# Patient Record
Sex: FEMALE | Race: WHITE | NOT HISPANIC OR LATINO | URBAN - METROPOLITAN AREA
[De-identification: names, ages, dates, MRNs, and addresses within clinical notes are randomized per-mention and may not be internally consistent; named-entity substitution may affect disease eponyms.]

---

## 2023-01-31 ENCOUNTER — OFFICE VISIT (OUTPATIENT)
Dept: CARDIOLOGY | Facility: MEDICAL CENTER | Age: 63
End: 2023-01-31
Payer: COMMERCIAL

## 2023-01-31 VITALS
HEART RATE: 57 BPM | RESPIRATION RATE: 15 BRPM | WEIGHT: 206 LBS | SYSTOLIC BLOOD PRESSURE: 100 MMHG | HEIGHT: 62 IN | DIASTOLIC BLOOD PRESSURE: 82 MMHG | OXYGEN SATURATION: 95 % | BODY MASS INDEX: 37.91 KG/M2

## 2023-01-31 DIAGNOSIS — I10 PRIMARY HYPERTENSION: ICD-10-CM

## 2023-01-31 DIAGNOSIS — Z01.810 PRE-OPERATIVE CARDIOVASCULAR EXAMINATION: ICD-10-CM

## 2023-01-31 DIAGNOSIS — R00.2 PALPITATIONS: ICD-10-CM

## 2023-01-31 DIAGNOSIS — R01.1 HEART MURMUR: ICD-10-CM

## 2023-01-31 DIAGNOSIS — R06.09 DOE (DYSPNEA ON EXERTION): ICD-10-CM

## 2023-01-31 PROCEDURE — 99204 OFFICE O/P NEW MOD 45 MIN: CPT | Performed by: NURSE PRACTITIONER

## 2023-01-31 PROCEDURE — 93000 ELECTROCARDIOGRAM COMPLETE: CPT | Performed by: INTERNAL MEDICINE

## 2023-01-31 NOTE — PROGRESS NOTES
Cardiology New Consultation Note    Date of note:    2/1/2023  Primary Care Provider: No primary care provider on file.    Name:             Lillie Martinez  YOB: 1960  MRN:               [id number]    CC: Preop clearance    Patient HPI:   Lillie Martinez is a 62 y.o. female with current medical problems including hypertension, HLD, and Johnson's esophagus. Recently had an abnormal EKG and was referred to cardiology for preoperative clearance for an upcoming upper/lower endoscopy with [facility].    Underwent Eye surgery last summer.   Having random chest pain episodes, had it more with anxiety.  Has palpitations at times, feels her HR is in the 100's when experiencing them.  She has random sharp chest pains. Not lasting long, not with exertion.   She has had DELGADO with exertion for a few years, no CP with exertion.   She has fallen frequently in the past year, feels unbalanced, and decreased strength in legs.   Has gained 20lbs in the past year. Not able to exercise d/t hip pain.  She denies dizziness or syncopal episodes, orthopnea, PND, lower extremity swelling, and recent weight gain.  Patient endorses medication compliance    Cardiovascular Risk Factors:  1. Smoking status: never  2. Type II Diabetes Mellitus: prediabetic No results found for: HBA1C  3. Hypertension: yes  4. Dyslipidemia: yes No results found for: CHOLSTRLTOT, LDL, HDL, TRIGLYCERIDE  5. Family history of early Coronary Artery Disease in a first degree relative (Male less than 55 years of age; Female less than 65 years of age): Father @ 61  6.  Obesity and/or Metabolic Syndrome: BMI 37.7  7. Sedentary lifestyle: No     Review of systems:  All others systems reviewed and negative except for what is outlined in the above HPI    History reviewed. No pertinent past medical history.  History reviewed. No pertinent surgical history.  History reviewed. No pertinent family history.  Social History     Socioeconomic History    Marital  "status:      Spouse name: Not on file    Number of children: Not on file    Years of education: Not on file    Highest education level: Not on file   Occupational History    Not on file   Tobacco Use    Smoking status: Never    Smokeless tobacco: Never   Substance and Sexual Activity    Alcohol use: Not Currently    Drug use: Never    Sexual activity: Not on file   Other Topics Concern    Not on file   Social History Narrative    Not on file     Social Determinants of Health     Financial Resource Strain: Not on file   Food Insecurity: Not on file   Transportation Needs: Not on file   Physical Activity: Not on file   Stress: Not on file   Social Connections: Not on file   Intimate Partner Violence: Not on file   Housing Stability: Not on file     Allergies   Allergen Reactions    Penicillins Unspecified and Rash     Other reaction(s): \"thrush in the mouth\"  thrush  thrush      Sudafed Hives    Lactose Diarrhea    Pseudobulb Hives, Itching and Palpitations    Pseudoephedrine Hcl Hives    Rabbit Protein Hives, Itching and Runny Nose     Current Outpatient Medications   Medication Sig Dispense Refill    atorvastatin (LIPITOR) 10 MG Tab Take 10 mg by mouth every day.      vitamin D3 (CHOLECALCIFEROL) 5000 Unit (125 mcg) Tab Take 5,000 Units by mouth every day.      fexofenadine (ALLEGRA ALLERGY) 180 MG tablet Take 180 mg by mouth every day.      lisinopril (PRINIVIL) 10 MG Tab Take 10 mg by mouth every day.      hydroCHLOROthiazide (HYDRODIURIL) 25 MG Tab Take 25 mg by mouth every day.      omeprazole (PRILOSEC) 20 MG delayed-release capsule Take 20 mg by mouth every day.      sertraline (ZOLOFT) 100 MG Tab Take 100 mg by mouth every day.       No current facility-administered medications for this visit.       Physical Exam:  Ambulatory Vitals  /82 (BP Location: Left arm, Patient Position: Sitting, BP Cuff Size: Adult)   Pulse (!) 57   Resp 15   Ht 1.575 m (5' 2\")   Wt 93.4 kg (206 lb)   SpO2 95%  "   BP Readings from Last 4 Encounters:   23 100/82       Weight/BMI: Body mass index is 37.68 kg/m².  Wt Readings from Last 4 Encounters:   23 93.4 kg (206 lb)       General: No apparent distress. Well nourished. BMI 37.7   Neck: No JVD. No caroid bruits, trachea midline  Lungs: CTAB. Normal effort, without crackles/rhonchi, no wheezing  Heart: RRR. Normal S1/S2, soft 2/6 murmur, no rub. No lower extremity edema. 2+ radial pulses, 2+ DT pulses  Ext: No clubbing or cyanosis.  Abdomen: soft, non tender, non distended, no simone hepatomegaly.  Neurological: No focal deficits, no facial asymmetry.  Normal speech.  Psychiatric: Appropriate affect, alert and oriented x 4.   Skin: Warm and dry, no rash.    Lab Data Review:  No results found for: CHOLSTRLTOT, LDL, HDL, TRIGLYCERIDE    No results found for: SODIUM, POTASSIUM, CHLORIDE, CO2, GLUCOSE, BUN, CREATININE, BUNCREATRAT, GLOMRATE  No results found for: ALKPHOSPHAT, ASTSGOT, ALTSGPT, TBILIRUBIN   No results found for: WBC    Cardiac Imaging and Procedures Review:      EKG 23: My Personal interpretation reveals SB 57, no ST changes or T wave abnormalities    Assessment and Clinical Decision Makin. Pre-operative cardiovascular examination  EKG      2. Primary hypertension        3. Palpitations  Holter Monitor Study    EC-ECHOCARDIOGRAM COMPLETE W/O CONT      4. DELGADO (dyspnea on exertion)  EC-ECHOCARDIOGRAM COMPLETE W/O CONT      5. Heart murmur  EC-ECHOCARDIOGRAM COMPLETE W/O CONT        The following treatment plan was discussed    Preop clearance  -Patient is low risk of cardiovascular complications for low to moderate risk surgery or procedures. She is medically optimized based on my last in person assessment, and if her symptoms have not changed, surgery should proceed without further cardiac work-up.  No further cardiac testing needed prior to endoscopy    HTN  -Well controlled today in clinic  -Continue lisinopril 10 mg daily,  hydrochlorothiazide 25 mg daily    Palpitations  -Patient symptomatic, lasting few minutes  -Obtain 2-week Zio patch monitor    DELGADO  History of heart murmur  -Obtain baseline echocardiogram    Plan reviewed in detail with the patient, verbalizes understanding and is in agreement.  Pt is to follow up with myself in 4 months after Echo completed  Encouraged Pt to follow up with us over the phone or electronically using my [employer] as cardiac issues/concerns arise.      PLEASE NOTE: This dictation was created using voice recognition software. I have made every reasonable attempt to correct obvious errors, but I expect that there are errors of grammar and possibly content that I did not discover before finalizing the note.       ELIAN Riddle.   [facility]  [phone]    Collaborating Physician: Dr. Parisi

## 2023-02-01 PROBLEM — K64.9 HEMORRHOIDS: Status: ACTIVE | Noted: 2023-02-01

## 2023-02-01 PROBLEM — K92.2 GI BLEED: Status: ACTIVE | Noted: 2023-02-01

## 2023-02-01 PROBLEM — F32.9 MAJOR DEPRESSIVE DISORDER, SINGLE EPISODE, UNSPECIFIED: Status: ACTIVE | Noted: 2023-02-01

## 2023-02-01 PROBLEM — Z86.010 PERSONAL HISTORY OF COLONIC POLYPS: Status: ACTIVE | Noted: 2023-02-01

## 2023-02-01 PROBLEM — R00.2 PALPITATIONS: Status: ACTIVE | Noted: 2023-02-01

## 2023-02-01 PROBLEM — F41.9 ANXIETY DISORDER: Status: ACTIVE | Noted: 2023-02-01

## 2023-02-01 PROBLEM — K57.92 DIVERTICULITIS: Status: ACTIVE | Noted: 2023-02-01

## 2023-02-01 PROBLEM — K22.70 BARRETT'S ESOPHAGUS WITHOUT DYSPLASIA: Status: ACTIVE | Noted: 2023-02-01

## 2023-02-01 PROBLEM — K21.9 GASTRO-ESOPHAGEAL REFLUX DISEASE WITHOUT ESOPHAGITIS: Status: ACTIVE | Noted: 2023-02-01

## 2023-02-01 PROBLEM — R06.00 DYSPNEA, UNSPECIFIED: Status: ACTIVE | Noted: 2023-02-01

## 2023-02-01 PROBLEM — R01.1 HEART MURMUR: Status: ACTIVE | Noted: 2023-02-01

## 2023-02-01 PROBLEM — K58.9 IRRITABLE BOWEL SYNDROME: Status: ACTIVE | Noted: 2023-02-01

## 2023-02-01 PROBLEM — Z86.0100 PERSONAL HISTORY OF COLONIC POLYPS: Status: ACTIVE | Noted: 2023-02-01

## 2023-02-01 LAB — EKG IMPRESSION: NORMAL

## 2023-02-01 RX ORDER — SERTRALINE HYDROCHLORIDE 100 MG/1
100 TABLET, FILM COATED ORAL DAILY
COMMUNITY
Start: 2022-12-27

## 2023-02-01 RX ORDER — LISINOPRIL 10 MG/1
10 TABLET ORAL DAILY
COMMUNITY
Start: 2020-05-08

## 2023-02-01 RX ORDER — ATORVASTATIN CALCIUM 10 MG/1
10 TABLET, FILM COATED ORAL DAILY
COMMUNITY
Start: 2022-06-02

## 2023-02-01 RX ORDER — HYDROCHLOROTHIAZIDE 25 MG/1
25 TABLET ORAL DAILY
COMMUNITY
Start: 2023-01-09

## 2023-02-01 RX ORDER — OMEPRAZOLE 20 MG/1
20 CAPSULE, DELAYED RELEASE ORAL DAILY
COMMUNITY
Start: 2022-11-29

## 2023-02-01 RX ORDER — FEXOFENADINE HCL 180 MG/1
180 TABLET ORAL DAILY
COMMUNITY
Start: 2020-11-20

## 2023-02-06 ENCOUNTER — TELEPHONE (OUTPATIENT)
Dept: HEALTH INFORMATION MANAGEMENT | Facility: OTHER | Age: 63
End: 2023-02-06

## 2023-02-17 ENCOUNTER — TELEPHONE (OUTPATIENT)
Dept: CARDIOLOGY | Facility: MEDICAL CENTER | Age: 63
End: 2023-02-17

## 2023-02-17 NOTE — TELEPHONE ENCOUNTER
MR    Caller: Anai    Office Name, phone number, fax number: [facility] / PH. [phone] / FAX:  [phone]    Fax clearance to [phone] on 02-    Procedure Name: Colonoscopy & Endoscopy    Procedure Scheduled Date: 02-    Callback Number: [phone]

## 2023-02-22 NOTE — TELEPHONE ENCOUNTER
Received fax from [facility] (P: [phone] F: [phone]) requesting:    - cardiac clearance for upcoming EGD & colonoscopy scheduled 2/27/2023.    Last OV note printed and faxed to above number, completed status received.  Fax sent to scanning for reference via [employer] completed status received.

## 2023-06-19 PROBLEM — M16.12 PRIMARY OSTEOARTHRITIS OF LEFT HIP: Status: ACTIVE | Noted: 2023-06-19

## 2024-02-01 ENCOUNTER — PATIENT MESSAGE (OUTPATIENT)
Dept: HEALTH INFORMATION MANAGEMENT | Facility: OTHER | Age: 64
End: 2024-02-01

## 2024-04-03 ENCOUNTER — APPOINTMENT (OUTPATIENT)
Dept: RADIOLOGY | Facility: MEDICAL CENTER | Age: 64
End: 2024-04-03
Attending: EMERGENCY MEDICINE
Payer: COMMERCIAL

## 2024-04-03 ENCOUNTER — HOSPITAL ENCOUNTER (EMERGENCY)
Facility: MEDICAL CENTER | Age: 64
End: 2024-04-03
Attending: EMERGENCY MEDICINE
Payer: COMMERCIAL

## 2024-04-03 VITALS
DIASTOLIC BLOOD PRESSURE: 63 MMHG | HEART RATE: 58 BPM | WEIGHT: 196.87 LBS | SYSTOLIC BLOOD PRESSURE: 110 MMHG | BODY MASS INDEX: 36.01 KG/M2 | OXYGEN SATURATION: 95 % | RESPIRATION RATE: 20 BRPM | TEMPERATURE: 98.2 F

## 2024-04-03 DIAGNOSIS — M79.602 MUSCULOSKELETAL PAIN OF LEFT UPPER EXTREMITY: ICD-10-CM

## 2024-04-03 DIAGNOSIS — R07.81 RIB PAIN ON LEFT SIDE: ICD-10-CM

## 2024-04-03 DIAGNOSIS — G89.29 CHRONIC LEFT SHOULDER PAIN: ICD-10-CM

## 2024-04-03 DIAGNOSIS — M25.512 CHRONIC LEFT SHOULDER PAIN: ICD-10-CM

## 2024-04-03 DIAGNOSIS — G47.9 SLEEP DISTURBANCE: ICD-10-CM

## 2024-04-03 LAB
ALBUMIN SERPL BCP-MCNC: 4.2 G/DL (ref 3.2–4.9)
ALBUMIN/GLOB SERPL: 1.6 G/DL
ALP SERPL-CCNC: 85 U/L (ref 30–99)
ALT SERPL-CCNC: 20 U/L (ref 2–50)
ANION GAP SERPL CALC-SCNC: 14 MMOL/L (ref 7–16)
AST SERPL-CCNC: 22 U/L (ref 12–45)
BASOPHILS # BLD AUTO: 0.6 % (ref 0–1.8)
BASOPHILS # BLD: 0.03 K/UL (ref 0–0.12)
BILIRUB SERPL-MCNC: 0.8 MG/DL (ref 0.1–1.5)
BUN SERPL-MCNC: 23 MG/DL (ref 8–22)
CALCIUM ALBUM COR SERPL-MCNC: 9.6 MG/DL (ref 8.5–10.5)
CALCIUM SERPL-MCNC: 9.8 MG/DL (ref 8.5–10.5)
CHLORIDE SERPL-SCNC: 103 MMOL/L (ref 96–112)
CO2 SERPL-SCNC: 24 MMOL/L (ref 20–33)
COHGB MFR BLD: 2.5 % (ref 0–4.9)
CREAT SERPL-MCNC: 0.72 MG/DL (ref 0.5–1.4)
EKG IMPRESSION: NORMAL
EOSINOPHIL # BLD AUTO: 0.04 K/UL (ref 0–0.51)
EOSINOPHIL NFR BLD: 0.8 % (ref 0–6.9)
ERYTHROCYTE [DISTWIDTH] IN BLOOD BY AUTOMATED COUNT: 46.3 FL (ref 35.9–50)
GFR SERPLBLD CREATININE-BSD FMLA CKD-EPI: 93 ML/MIN/1.73 M 2
GLOBULIN SER CALC-MCNC: 2.6 G/DL (ref 1.9–3.5)
GLUCOSE SERPL-MCNC: 91 MG/DL (ref 65–99)
HCT VFR BLD AUTO: 38.3 % (ref 37–47)
HGB BLD-MCNC: 12.5 G/DL (ref 12–16)
IMM GRANULOCYTES # BLD AUTO: 0.01 K/UL (ref 0–0.11)
IMM GRANULOCYTES NFR BLD AUTO: 0.2 % (ref 0–0.9)
LYMPHOCYTES # BLD AUTO: 1.26 K/UL (ref 1–4.8)
LYMPHOCYTES NFR BLD: 25.8 % (ref 22–41)
MCH RBC QN AUTO: 28.5 PG (ref 27–33)
MCHC RBC AUTO-ENTMCNC: 32.6 G/DL (ref 32.2–35.5)
MCV RBC AUTO: 87.2 FL (ref 81.4–97.8)
MONOCYTES # BLD AUTO: 0.35 K/UL (ref 0–0.85)
MONOCYTES NFR BLD AUTO: 7.2 % (ref 0–13.4)
NEUTROPHILS # BLD AUTO: 3.19 K/UL (ref 1.82–7.42)
NEUTROPHILS NFR BLD: 65.4 % (ref 44–72)
NRBC # BLD AUTO: 0 K/UL
NRBC BLD-RTO: 0 /100 WBC (ref 0–0.2)
PLATELET # BLD AUTO: 177 K/UL (ref 164–446)
PMV BLD AUTO: 9.5 FL (ref 9–12.9)
POTASSIUM SERPL-SCNC: 3.4 MMOL/L (ref 3.6–5.5)
PROT SERPL-MCNC: 6.8 G/DL (ref 6–8.2)
RBC # BLD AUTO: 4.39 M/UL (ref 4.2–5.4)
SODIUM SERPL-SCNC: 141 MMOL/L (ref 135–145)
WBC # BLD AUTO: 4.9 K/UL (ref 4.8–10.8)

## 2024-04-03 PROCEDURE — 80053 COMPREHEN METABOLIC PANEL: CPT

## 2024-04-03 PROCEDURE — 73030 X-RAY EXAM OF SHOULDER: CPT | Mod: LT

## 2024-04-03 PROCEDURE — 36415 COLL VENOUS BLD VENIPUNCTURE: CPT

## 2024-04-03 PROCEDURE — 71101 X-RAY EXAM UNILAT RIBS/CHEST: CPT | Mod: LT

## 2024-04-03 PROCEDURE — 73080 X-RAY EXAM OF ELBOW: CPT | Mod: LT

## 2024-04-03 PROCEDURE — 73090 X-RAY EXAM OF FOREARM: CPT | Mod: LT

## 2024-04-03 PROCEDURE — 93005 ELECTROCARDIOGRAM TRACING: CPT | Performed by: EMERGENCY MEDICINE

## 2024-04-03 PROCEDURE — 99283 EMERGENCY DEPT VISIT LOW MDM: CPT

## 2024-04-03 PROCEDURE — 85025 COMPLETE CBC W/AUTO DIFF WBC: CPT

## 2024-04-03 PROCEDURE — 82375 ASSAY CARBOXYHB QUANT: CPT

## 2024-04-03 NOTE — ED NOTES
Pt d/c home ambulatory with family. Pt given d/c instructions and signed d/c paper. Pt educated on follow up plan, pt verbalized understanding of d/c instructions. PT iv removed with bleeding controlled tip intact. PT vs stable. Pt had all belongings at d/c.

## 2024-04-03 NOTE — DISCHARGE INSTRUCTIONS
Please call the sleep medicine clinic listed above to schedule follow-up appointment for complete recheck.  Return to the emergency department if you develop any new or worsening symptoms including chest pain, shortness of breath, fevers, cough, worsening arm pain, or any further concerns.  You may continue to follow-up with your primary care clinic for coordination of specialist care, as well as orthopedic clinic for continued treatment of rotator cuff injury.

## 2024-04-03 NOTE — ED TRIAGE NOTES
"Chief Complaint   Patient presents with    Sleep Problem     Pt reports \"I jumped out of bed last night and hit my nose, experiencing a bloody nose.\"  She reports this is her second event of waking up from a nightmare and jumping out of bed and hurting herself.       Pt does not take blood thinner.  Epistaxis stopped before arriving to hospital.  C/O pain to L side of body.    "

## 2024-04-03 NOTE — ED NOTES
Pt ambulated to room GRN 23 without incident, pt oriented to care area. Pt changing into gown, chart up for ERP.

## 2024-04-03 NOTE — ED PROVIDER NOTES
"Emergency Physician Note    Chief Concern:  Chief Complaint   Patient presents with    Sleep Problem     Pt reports \"I jumped out of bed last night and hit my nose, experiencing a bloody nose.\"  She reports this is her second event of waking up from a nightmare and jumping out of bed and hurting herself.         HPI/ROS    HPI:  Lillie Martinez is a 63 y.o. female who presents to the emergency department today for some left-sided body aches after she fell out of bed overnight.  She reports that she jumped up out of bed while she was sleeping, did not realize she did this, and fell striking her nose, and landing on the left side of her body.  She initially had an episode of epistaxis which is since resolved.  She is not having any facial pain, no ongoing epistaxis.  And landing on the left side of her body.  She initially had an episode of epistaxis which is since resolved.  She is not having any facial pain, no ongoing epistaxis.  She has history of left rotator cuff, states that pain from her rotator cuff injury has been exacerbated after the fall.  She also reports some discomfort localized to the left ribs, though no shortness of breath and no pain with taking a deep breath.  She also reports some pain localized to the left elbow and left forearm, though states she is able to fully range the elbow and wrist.  Range of motion of the left shoulder is limited by known rotator cuff tear.    She is not currently on any sleep medications, does not take Ambien or Lunesta.  She reports 1 similar episode of getting up out of bed without realizing it that occurred about 6 months ago.  Her other concern today is that she may have a REM sleep disorder, which a family member reported to her after googling her symptoms.    She reports no associated headache, no nausea, no vomiting, she does not have any neck or back pain.    PAST MEDICAL HISTORY  History reviewed. No pertinent past medical history.    SURGICAL " HISTORY  History reviewed. No pertinent surgical history.    FAMILY HISTORY  History reviewed. No pertinent family history.    SOCIAL HISTORY   reports that she has never smoked. She has never used smokeless tobacco. She reports that she does not currently use alcohol. She reports that she does not use drugs.    CURRENT MEDICATIONS  Discharge Medication List as of 4/3/2024 11:02 AM        CONTINUE these medications which have NOT CHANGED    Details   atorvastatin (LIPITOR) 10 MG Tab Take 10 mg by mouth every day., Historical Med      vitamin D3 (CHOLECALCIFEROL) 5000 Unit (125 mcg) Tab Take 5,000 Units by mouth every day., Historical Med      fexofenadine (ALLEGRA ALLERGY) 180 MG tablet Take 180 mg by mouth every day., Historical Med      hydroCHLOROthiazide (HYDRODIURIL) 25 MG Tab Take 25 mg by mouth every day., Historical Med      lisinopril (PRINIVIL) 10 MG Tab Take 10 mg by mouth every day., Historical Med      omeprazole (PRILOSEC) 20 MG delayed-release capsule Take 20 mg by mouth every day., Historical Med      sertraline (ZOLOFT) 100 MG Tab Take 100 mg by mouth every day., Historical Med             ALLERGIES  Penicillins, Sudafed, Lactose, Pseudobulb, Pseudoephedrine hcl, and Rabbit protein    PHYSICAL EXAM  Vital Signs: /63   Pulse (!) 58   Temp 36.8 °C (98.2 °F) (Temporal)   Resp 20   Wt 89.3 kg (196 lb 13.9 oz)   SpO2 95%   BMI 36.01 kg/m²   Constitutional: Alert, no acute distress  HENT: Normocephalic, atraumatic, no nasal septal hematoma  Neck: No bony midline tenderness to palpation, no pain with range of motion  Cardiovascular: No tachycardia  Pulmonary: No respiratory distress, normal work of breathing, breath sounds quiet and equal bilaterally  Abdomen: Soft, non tender, nondistended  Skin: Warm, dry, no rashes or lesions  Musculoskeletal: She does have some mild discomfort on palpation of the left ribs.  Somewhat limited range of motion of the left shoulder which is reported present at  baseline and unchanged today.  She has no point bony tenderness to palpation of the left elbow or forearm, does appear to have full range of motion of the left elbow, and wrist, with reproduced pain on range of motion of the left elbow.  Neurologic: Alert, oriented, normal motor function, no speech deficits, no facial droop, 5 out of 5 bilateral upper and lower extremity strength, no ataxia  Psychiatric: Normal and appropriate mood and affect    Diagnostic Studies & Procedures    Labs:  All labs reviewed by me as noted below.    EK Lead EKG interpreted by me as noted below:  Results for orders placed or performed during the hospital encounter of 24   EKG   Result Value Ref Range    Report       [facility] Emergency Dept.    Test Date:  2024  Pt Name:    KYLER TREADWELL                  Department: ER  MRN:        [id number]                      Room:       [facility]  Gender:     Female                       Technician: [id number]  :        1960                   Requested By:EZ BYERS  Order #:    [id number]                    Reading MD: EZ BYERS MD    Measurements  Intervals                                Axis  Rate:       55                           P:          72  VT:         163                          QRS:        -20  QRSD:       112                          T:          18  QT:         473  QTc:        453    Interpretive Statements  Rate 55, sinus rhythm, no ST elevation or depression, no pathologic T wave  inversions, normal QTc, no significant changes compared to prior EKG, she  does have slight T wave deflections in V1 - V3 unchanged from prior.  Electronically Signed On 2024 10:54:41 PDT by EZ BYERS MD         Radiology:  The attending Emergency Physician has independently interpreted the following imaging:  I independently interpreted the plain film of the elbow, No evidence of fracture or dislocation, radial head is normal-appearing without  evidence of fracture.    DX-SHOULDER 2+ LEFT   Final Result      1.  Normal shoulder series.      [id number] (WITH 1-VIEW CXR) LEFT   Final Result      Normal rib series.      DX-FOREARM LEFT   Final Result      Normal forearm series.      DX-ELBOW-COMPLETE 3+ LEFT   Final Result      Normal elbow series.          Course and Medical Decision Making    Initial Assessment and Plan:  Ms. Martinez presents to the emergency department today for evaluation after an abnormal episode of sleep.  Primary concern is for possible injury to the left upper extremity or left chest wall.  Her physical examination is reassuring with no point tenderness, she has no hypoxia, no significant pain with respirations, clinically symptoms are less concerning for rib fracture, chest wall contusion is a consideration.  She also has full range of motion of the left elbow and forearm with no point bony tenderness to palpation.    On laboratory evaluation CBC is entirely within normal limits.  CMP is reassuring with no electrolyte abnormality.  Carboxyhemoglobin is within normal limits, no evidence of carbon monoxide toxicity disturbing sleep.  She has a normal platelet count, normal liver enzymes, no evidence of bleeding disorder.    Plain films of the left ribs, left shoulder, left elbow, and left forearm demonstrate no acute abnormalities.    Suspect symptoms are likely due to soft tissue or muscular contusions from the fall.  Do not believe advanced imaging is necessary at this time.  Additionally, she is concerned about a possible REM sleep disorder, I have provided follow-up information as well as a referral to pulmonary and sleep medicine, as a believe she would likely benefit from a sleep study.    Additional Problems and Disposition    Additional problems addressed:   1.  Concern for REM sleep disorder -referral placed for pulmonary sleep medicine    Escalation of care considered, and ultimately not performed:   1.  CT  imaging/advanced imaging -CT imaging the brain was considered based on head injury, however she has no headache, no nausea, no vomiting, did not report loss of consciousness, no indication for escalation to advanced imaging of the brain at this time.    Decision tools utilized including but not limited to:  Nexus criteria -no indication for cervical spine imaging    Disposition:  Discharged in stable condition    FINAL IMPRESSION   1. Sleep disturbance    2. Chronic left shoulder pain    3. Rib pain on left side    4. Musculoskeletal pain of left upper extremity        FOLLOW UP:  GABRIEL Portillo  [address]  [facility] [id number] [phone]    Schedule an appointment as soon as possible for a visit       [facility] - Operated by [facility]  [address]  [facility] [id number] [phone]  Schedule an appointment as soon as possible for a visit       [facility], Emergency Dept  [address] [facility] [address] [phone]  Go to   If symptoms worsen

## 2024-11-20 ENCOUNTER — TELEPHONE (OUTPATIENT)
Dept: HEALTH INFORMATION MANAGEMENT | Facility: OTHER | Age: 64
End: 2024-11-20
Payer: COMMERCIAL

## 2024-12-09 ENCOUNTER — TELEPHONE (OUTPATIENT)
Dept: HEALTH INFORMATION MANAGEMENT | Facility: OTHER | Age: 64
End: 2024-12-09
Payer: COMMERCIAL

## 2024-12-09 ENCOUNTER — APPOINTMENT (OUTPATIENT)
Dept: CARDIOLOGY | Facility: MEDICAL CENTER | Age: 64
End: 2024-12-09
Payer: COMMERCIAL

## 2024-12-30 ENCOUNTER — APPOINTMENT (OUTPATIENT)
Dept: CARDIOLOGY | Facility: MEDICAL CENTER | Age: 64
End: 2024-12-30
Attending: PHYSICIAN ASSISTANT
Payer: COMMERCIAL

## 2024-12-30 ENCOUNTER — TELEPHONE (OUTPATIENT)
Dept: HEALTH INFORMATION MANAGEMENT | Facility: OTHER | Age: 64
End: 2024-12-30
Payer: COMMERCIAL

## 2025-02-07 ENCOUNTER — TELEPHONE (OUTPATIENT)
Dept: CARDIOLOGY | Facility: MEDICAL CENTER | Age: 65
End: 2025-02-07
Payer: COMMERCIAL

## 2025-02-07 ENCOUNTER — OFFICE VISIT (OUTPATIENT)
Dept: CARDIOLOGY | Facility: MEDICAL CENTER | Age: 65
End: 2025-02-07
Payer: COMMERCIAL

## 2025-02-07 VITALS
OXYGEN SATURATION: 96 % | HEART RATE: 68 BPM | WEIGHT: 198 LBS | RESPIRATION RATE: 16 BRPM | HEIGHT: 63 IN | DIASTOLIC BLOOD PRESSURE: 80 MMHG | SYSTOLIC BLOOD PRESSURE: 118 MMHG | BODY MASS INDEX: 35.08 KG/M2

## 2025-02-07 DIAGNOSIS — R00.2 PALPITATIONS: ICD-10-CM

## 2025-02-07 DIAGNOSIS — E78.5 DYSLIPIDEMIA: ICD-10-CM

## 2025-02-07 DIAGNOSIS — R40.0 DAYTIME SLEEPINESS: ICD-10-CM

## 2025-02-07 DIAGNOSIS — I10 PRIMARY HYPERTENSION: ICD-10-CM

## 2025-02-07 PROCEDURE — 99212 OFFICE O/P EST SF 10 MIN: CPT

## 2025-02-07 PROCEDURE — 3074F SYST BP LT 130 MM HG: CPT

## 2025-02-07 PROCEDURE — 3079F DIAST BP 80-89 MM HG: CPT

## 2025-02-07 PROCEDURE — 99214 OFFICE O/P EST MOD 30 MIN: CPT

## 2025-02-07 RX ORDER — HYDROCHLOROTHIAZIDE 25 MG/1
25 TABLET ORAL DAILY
Qty: 90 TABLET | Refills: 3 | Status: SHIPPED | OUTPATIENT
Start: 2025-02-07

## 2025-02-07 RX ORDER — LISINOPRIL 10 MG/1
10 TABLET ORAL DAILY
Qty: 100 TABLET | Refills: 3 | Status: SHIPPED | OUTPATIENT
Start: 2025-02-07

## 2025-02-07 RX ORDER — ATORVASTATIN CALCIUM 10 MG/1
10 TABLET, FILM COATED ORAL DAILY
Qty: 100 TABLET | Refills: 3 | Status: SHIPPED | OUTPATIENT
Start: 2025-02-07

## 2025-02-07 ASSESSMENT — ENCOUNTER SYMPTOMS
LIGHT-HEADEDNESS: 0
HEADACHES: 0
SYNCOPE: 0
SHORTNESS OF BREATH: 0
ORTHOPNEA: 0
DIZZINESS: 0
NEAR-SYNCOPE: 0
PND: 0
PALPITATIONS: 0

## 2025-02-07 ASSESSMENT — FIBROSIS 4 INDEX: FIB4 SCORE: 1.81

## 2025-02-07 NOTE — Clinical Note
Hi,  Can you please request records from [facility] cardiology for any echocardiogram or diagnostics. She said she had an echocardiogram done. She said it might also have been at [facility] because she was on a sliding scale. If you can request both for any cardiac testing.  Thank you!

## 2025-02-07 NOTE — TELEPHONE ENCOUNTER
Unable to reach Pt left VM regarding her appt today with [facility]. Called to see if she can come in at 2pm instead of 4:15pm.

## 2025-02-08 NOTE — PROGRESS NOTES
"Chief Complaint   Patient presents with    Hypertension     FV DX:Pre-operative cardiovascular examination    Palpitations          Subjective:   Lillie Martinez is a 64 y.o. female who presents today for follow-up.     Previous patient of Flaquita Ha.  Current medical problems include HTN, HLD and Johnson's esophagua. Their last clinic visit was 1/31/2023 with Flaquita Ha.    Today's visit:  Patient reports she feels she has been doing overall well from a cardiac perspective. She does report she has some exertional dyspnea but could be due to deconditioning as she finds it difficult to do activity due to her pain in her knee which she is hoping to have surgery on to replace. She does report some occasional chest pain with sitting. She says she will have some every couple of months but yolanda any chest pain with exertion. She denies palpations, lightheadedness/dizziness, orthopnea, PND, edema or syncope. She said last year she had a echocardiogram done at either [facility] or [facility] which she is not sure the results. Due to insurance she was being followed by Dr. JAIME Tee at [facility] but she wants to re-establish with [facility]. We do not have the records from the echocardiogram to review at today's visit.     Cardiovascular Risk Factors:  1. Smoking status: Never  2. Type II Diabetes Mellitus: No No results found for: \"HBA1C\"  3. Hypertension: Yes  4. Dyslipidemia: Yes No results found for: \"CHOLSTRLTOT\", \"LDL\", \"HDL\", \"TRIGLYCERIDE\"  No past medical history on file.      No family history on file.      Social History     Tobacco Use    Smoking status: Never    Smokeless tobacco: Never   Vaping Use    Vaping status: Never Used   Substance Use Topics    Alcohol use: Not Currently    Drug use: Never         Allergies   Allergen Reactions    Penicillins Unspecified and Rash     Other reaction(s): \"thrush in the mouth\"  thrush  thrush      Sudafed Hives    Lactose Diarrhea    Mixed Feathers Itching and Runny " "Nose    Pseudobulb Hives, Itching and Palpitations    Pseudoephedrine Hcl Hives    Rabbit Protein Hives, Itching and Runny Nose         Current Outpatient Medications   Medication Sig    metFORMIN (GLUCOPHAGE) 500 MG Tab Take 500 mg by mouth every day.    hydroCHLOROthiazide 25 MG Tab Take 1 Tablet by mouth every day.    atorvastatin (LIPITOR) 10 MG Tab Take 1 Tablet by mouth every day.    lisinopril (PRINIVIL) 10 MG Tab Take 1 Tablet by mouth every day.    vitamin D3 (CHOLECALCIFEROL) 5000 Unit (125 mcg) Tab Take 5,000 Units by mouth every day.    fexofenadine (ALLEGRA ALLERGY) 180 MG tablet Take 180 mg by mouth every day.    omeprazole (PRILOSEC) 20 MG delayed-release capsule Take 20 mg by mouth every day.    sertraline (ZOLOFT) 100 MG Tab Take 200 mg by mouth every day.         Review of Systems   Constitutional: Negative for malaise/fatigue.   Cardiovascular:  Positive for chest pain and dyspnea on exertion. Negative for leg swelling, near-syncope, orthopnea, palpitations, paroxysmal nocturnal dyspnea and syncope.   Respiratory:  Negative for shortness of breath.    Musculoskeletal:  Positive for joint pain.   Neurological:  Negative for dizziness, headaches and light-headedness.           Objective:   /80 (BP Location: Left arm, Patient Position: Sitting, BP Cuff Size: Adult)   Pulse 68   Resp 16   Ht 1.588 m (5' 2.5\")   Wt 89.8 kg (198 lb)   SpO2 96%  Body mass index is 35.64 kg/m².         Physical Exam  Vitals reviewed.   Constitutional:       General: She is not in acute distress.     Appearance: Normal appearance.   HENT:      Head: Normocephalic and atraumatic.   Cardiovascular:      Rate and Rhythm: Normal rate and regular rhythm.      Pulses: Normal pulses.      Heart sounds: Murmur heard.   Pulmonary:      Effort: Pulmonary effort is normal. No respiratory distress.      Breath sounds: Normal breath sounds.   Musculoskeletal:      Right lower leg: No edema.      Left lower leg: No edema. " "  Neurological:      Mental Status: She is alert and oriented to person, place, and time.      Gait: Gait normal.   Psychiatric:         Behavior: Behavior normal.             Lab Results   Component Value Date/Time    SODIUM 141 04/03/2024 10:04 AM    POTASSIUM 3.4 (L) 04/03/2024 10:04 AM    CHLORIDE 103 04/03/2024 10:04 AM    CO2 24 04/03/2024 10:04 AM    GLUCOSE 91 04/03/2024 10:04 AM    BUN 23 (H) 04/03/2024 10:04 AM    CREATININE 0.72 04/03/2024 10:04 AM      Lab Results   Component Value Date/Time    WBC 4.9 04/03/2024 10:04 AM    RBC 4.39 04/03/2024 10:04 AM    HEMOGLOBIN 12.5 04/03/2024 10:04 AM    HEMATOCRIT 38.3 04/03/2024 10:04 AM    MCV 87.2 04/03/2024 10:04 AM    MCH 28.5 04/03/2024 10:04 AM    MCHC 32.6 04/03/2024 10:04 AM    MPV 9.5 04/03/2024 10:04 AM    NEUTSPOLYS 65.40 04/03/2024 10:04 AM    LYMPHOCYTES 25.80 04/03/2024 10:04 AM    MONOCYTES 7.20 04/03/2024 10:04 AM    EOSINOPHILS 0.80 04/03/2024 10:04 AM    BASOPHILS 0.60 04/03/2024 10:04 AM      No results found for: \"CHOLSTRLTOT\", \"LDL\", \"HDL\", \"TRIGLYCERIDE\"    No results found for: \"TROPONINT\"  No results found for: \"NTPROBNP\"  Assessment:   1. Primary hypertension  - hydroCHLOROthiazide 25 MG Tab; Take 1 Tablet by mouth every day.  Dispense: 90 Tablet; Refill: 3  - lisinopril (PRINIVIL) 10 MG Tab; Take 1 Tablet by mouth every day.  Dispense: 100 Tablet; Refill: 3  - Comp Metabolic Panel; Future    2. Palpitations    3. Dyslipidemia  - atorvastatin (LIPITOR) 10 MG Tab; Take 1 Tablet by mouth every day.  Dispense: 100 Tablet; Refill: 3  - Lipid Profile; Future  - Lipoprotein (a); Future    4. Daytime sleepiness  - Referral to Pulmonary and Sleep Medicine    Other orders  - metFORMIN (GLUCOPHAGE) 500 MG Tab; Take 500 mg by mouth every day.        Medical Decision Making:  Today's Assessment / Plan:   Hypertension  - Good control  - continue lisinopril 10 mg daily  -Continue hydrochlorothiazide 25 mg daily   - goal < " 130/80    Palpations  -Stable, denies reoccurrence    Hyperlipidemia  -Continue atorvastatin 10 mg daily  -Goal of less than 100  -Check lipid panel in 3 months and lipoprotein a    Daytime sleepiness  -Referral to sleep medicine for sleep study    Murmur  -Patient reports has been told she has had a murmur for a long time.   -Denies any progression of symptoms. Will request records to review recent echocardiogram. Pending results and continue surveillance echocardiogram as needed.    Return in about 6 months (around 8/7/2025) for Jasmyne ESPOSITO.  Sooner if problems.    ELINA Kahn.

## 2025-02-11 ENCOUNTER — TELEPHONE (OUTPATIENT)
Dept: CARDIOLOGY | Facility: MEDICAL CENTER | Age: 65
End: 2025-02-11
Payer: COMMERCIAL

## 2025-02-11 ASSESSMENT — ENCOUNTER SYMPTOMS: DYSPNEA ON EXERTION: 1

## 2025-02-11 NOTE — TELEPHONE ENCOUNTER
----- Message from Nurse Practitioner RUBINA Feliz sent at 2/11/2025  9:40 AM PST -----  Hi,    Can you please request records from [facility] cardiology for any echocardiogram or diagnostics. She said she had an echocardiogram done. She said it might also have been at [facility] because she was on a sliding scale. If you can request both for any cardiac testing.    Thank you!

## 2025-02-11 NOTE — TELEPHONE ENCOUNTER
Records request sent to [facility] and [facility] via right     fax.

## 2025-02-14 NOTE — TELEPHONE ENCOUNTER
LVM with [facility] medical records ([phone]) to follow up on medical records request. Advised to send medical records to [phone].     Called [facility] [phone]. Spoke with Zully who informed RN pt did not have any images done at [facility].     RN will follow up with [facility] next week.

## 2025-02-18 ENCOUNTER — TELEPHONE (OUTPATIENT)
Dept: CARDIOLOGY | Facility: MEDICAL CENTER | Age: 65
End: 2025-02-18
Payer: COMMERCIAL

## 2025-02-18 NOTE — TELEPHONE ENCOUNTER
Last OV: 02.07.2024  Proposed Surgery: Left MARÍA  Surgery Date: TBD  Requesting Office Name: [facility]  Fax Number: [phone]  Preference of Location (default is surgery center unless specified by Cardiologist or JULIANA)  Prior Clearance Addressed: No    Is this a general clearance? YES   Anticoags/Antiplatelets: N/A  Anticoags/Antiplatelet managed by Cardiology? N/A   Outstanding Cardiac Imaging : No  Ablation, Cardioversion, Stent, Cardiac Devices, Catheterization, Watchman: No  TAVR/Valve, Mitral Clip, Watchman (including open heart),: Completed within the last 6 months- Forward to provider to review  and N/A   Recent Cardiac Hospitalization: No            When: N/A  History (cardiac history): No past medical history on file.        Is this a dental clearance? NO  Ablation, Cardioversion, Watchman, Stents, Cath, Devices within the last 3 months? No   If yes- Send dental wait letter, do not forward to provider for review.     TAVR / Valve, Mitral clip within the last 6 months? No  If yes- Send dental wait letter, do not forward to provider for review.     If completing a general clearance, continue per protocol.           Surgical Clearance Letter Sent: YES   **Scan clearance request letter into [facility].**

## 2025-02-18 NOTE — LETTER
PROCEDURE/SURGERY CLEARANCE FORM      Encounter Date: 2/18/2025    Patient: Lillie Martinez  YOB: 1960    CARDIOLOGIST:  ELINA aKhn.   REFERRING DOCTOR:  No ref. provider found    Procedure/surgery: Left MARÍA    PROCEDURE/SURGERY CLEARANCE FORM    Date: 2/18/2025   Patient Name: Lillie Martinez    Dear Surgeon or Proceduralist,      Thank you for your request for cardiac stratification of our mutual patient Lillie Martinez 1960. We have reviewed their [facility] records; and to the best of our understanding this patient has not had stenting, ablation, watchman, cardiothoracic surgery or hospitalization for cardiovascular reasons in the past 6 months.  Lillie Martinez has been seen within the past 15 months and is considered to have non-modifiable cardiac risk for this low-risk procedure/surgery. They may proceed from a cardiovascular standpoint and may hold their antiplatelet/anticoagulation as briefly as possible. Please have patient resume this medication when hemodynamically stable to do so.     Aspirin or Prasugrel   - hold 7 days prior to procedure/surgery, resume when hemodynamically stable      Clopidrogrel or Ticagrelor  - hold 7 days for all neurological procedures, hold 5 days prior to all other procedure/surgery,  resume when hemodynamically stable     Warfarin - hold 7 days for all neurological procedures, hold 5 days prior to all other procedure/surgery and coordinate with [facility] Anticoagulation [facility] ([phone]) INR testing and dose management.      Pradaxa/Xarelto/Eliquis/Savesya - hold 1 day prior to procedure for low bleeding risk procedure, 2 days for high bleeding risk procedure, or consider holding 3 days or longer for patients with reduced kidney function (CrCl <30mL/min) or spinal/cranial surgeries/procedures.      If they have a mechanical heart valve, please coordinate with [facility] Anticoagulation Service ([phone]) the proper management of their  anticoagulant in the periprocedural or perioperative period.      Some patients have higher risk for cardiovascular complications or holding medication. If our patient has had prior complications of holding antiplatelet or anticoagulants in the past and we have seen them after these events, we have addressed these concerns with the patient. They are at an unknown degree of increased risk for recurrent complication.  You may hold anticoagulation/antiplatelets for the procedure or surgery if the benefits of the procedure or surgery outweigh this nonmodifiable risk.      If Lillie Cottrell Check 1960 has new symptoms of heart failure decompensation, unstable arrythmia, or angina please reach out and we will assess the patient.      If you have other patient-specific concerns, please feel free to reach out to the patient's cardiologist directly at [phone].     Thank you,       [facility]                                               Electronically signed      MD Signature   ELINA Kahn.

## 2025-02-21 NOTE — TELEPHONE ENCOUNTER
LVM with [facility] medical records ([phone]) to follow up on medical records request.     Re-faxed medical records request.     Confirmation fax receipt scanned into [employer].

## 2025-08-14 ASSESSMENT — ENCOUNTER SYMPTOMS: SLEEP DISTURBANCE: 0

## 2025-08-15 ENCOUNTER — OFFICE VISIT (OUTPATIENT)
Dept: SLEEP MEDICINE | Facility: MEDICAL CENTER | Age: 65
End: 2025-08-15
Payer: COMMERCIAL

## 2025-08-15 VITALS
DIASTOLIC BLOOD PRESSURE: 88 MMHG | HEART RATE: 76 BPM | WEIGHT: 201 LBS | OXYGEN SATURATION: 91 % | SYSTOLIC BLOOD PRESSURE: 128 MMHG | BODY MASS INDEX: 35.61 KG/M2 | HEIGHT: 63 IN | RESPIRATION RATE: 16 BRPM

## 2025-08-15 DIAGNOSIS — G47.52 DREAM ENACTMENT BEHAVIOR: ICD-10-CM

## 2025-08-15 DIAGNOSIS — G47.30 SLEEP DISORDER BREATHING: Primary | ICD-10-CM

## 2025-08-15 PROCEDURE — 99213 OFFICE O/P EST LOW 20 MIN: CPT

## 2025-08-15 PROCEDURE — 3079F DIAST BP 80-89 MM HG: CPT

## 2025-08-15 PROCEDURE — 3074F SYST BP LT 130 MM HG: CPT

## 2025-08-15 PROCEDURE — 99204 OFFICE O/P NEW MOD 45 MIN: CPT

## 2025-08-15 ASSESSMENT — FIBROSIS 4 INDEX: FIB4 SCORE: 1.81

## 2025-08-15 ASSESSMENT — LIFESTYLE VARIABLES
SKIP TO QUESTIONS 9-10: 1
HOW OFTEN DO YOU HAVE SIX OR MORE DRINKS ON ONE OCCASION: NEVER
HOW MANY STANDARD DRINKS CONTAINING ALCOHOL DO YOU HAVE ON A TYPICAL DAY: 1 OR 2
HOW OFTEN DO YOU HAVE A DRINK CONTAINING ALCOHOL: MONTHLY OR LESS
AUDIT-C TOTAL SCORE: 1

## 2025-09-08 ENCOUNTER — APPOINTMENT (OUTPATIENT)
Dept: SLEEP MEDICINE | Facility: MEDICAL CENTER | Age: 65
End: 2025-09-08
Payer: COMMERCIAL